# Patient Record
Sex: FEMALE | URBAN - NONMETROPOLITAN AREA
[De-identification: names, ages, dates, MRNs, and addresses within clinical notes are randomized per-mention and may not be internally consistent; named-entity substitution may affect disease eponyms.]

---

## 2019-02-28 ENCOUNTER — OFFICE VISIT (OUTPATIENT)
Dept: URBAN - NONMETROPOLITAN AREA CLINIC 9 | Facility: CLINIC | Age: 79
End: 2019-02-28
Payer: COMMERCIAL

## 2019-02-28 DIAGNOSIS — H25.11 AGE-RELATED NUCLEAR CATARACT OF RIGHT EYE: Chronic | ICD-10-CM

## 2019-02-28 PROCEDURE — 92014 COMPRE OPH EXAM EST PT 1/>: CPT | Performed by: OPTOMETRIST

## 2019-02-28 ASSESSMENT — VISUAL ACUITY: OD: 20/40

## 2019-02-28 ASSESSMENT — INTRAOCULAR PRESSURE
OD: 14
OS: 12

## 2019-02-28 NOTE — IMPRESSION/PLAN
Impression: Presence of intraocular lens: Z96.1. OS. Plan: Discussed diagnosis in detail with patient. No treatment is required at this time. Will continue to observe condition and or symptoms. Patient instructed to call if condition gets worse.

## 2019-02-28 NOTE — IMPRESSION/PLAN
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 Plan: Discussed diagnosis in detail with patient. No treatment is required at this time. Will continue to observe condition and or symptoms. Patient instructed to call if condition gets worse.

## 2019-02-28 NOTE — IMPRESSION/PLAN
Impression: Type 2 diabetes mellitus without complications: M55.0. OU. Plan: Diabetes type II: no background retinopathy, no signs of neovascularization noted. Discussed ocular and systemic benefits of blood sugar control.

## 2020-03-12 ENCOUNTER — OFFICE VISIT (OUTPATIENT)
Dept: URBAN - NONMETROPOLITAN AREA CLINIC 9 | Facility: CLINIC | Age: 80
End: 2020-03-12
Payer: COMMERCIAL

## 2020-03-12 DIAGNOSIS — H25.811 COMBINED FORMS OF AGE-RELATED CATARACT, RIGHT EYE: Chronic | ICD-10-CM

## 2020-03-12 DIAGNOSIS — H10.45 OTHER CHRONIC ALLERGIC CONJUNCTIVITIS: Chronic | ICD-10-CM

## 2020-03-12 PROCEDURE — 92014 COMPRE OPH EXAM EST PT 1/>: CPT | Performed by: OPTOMETRIST

## 2020-03-12 RX ORDER — AZELASTINE HYDROCHLORIDE 0.5 MG/ML
0.05 % SOLUTION/ DROPS OPHTHALMIC
Qty: 6 | Refills: 11 | Status: INACTIVE
Start: 2020-03-12 | End: 2021-05-03

## 2020-03-12 ASSESSMENT — KERATOMETRY
OD: 44.13
OS: 44.00

## 2020-03-12 ASSESSMENT — INTRAOCULAR PRESSURE
OD: 16
OS: 17

## 2020-03-12 ASSESSMENT — VISUAL ACUITY
OS: 20/20
OD: 20/40

## 2020-03-12 NOTE — IMPRESSION/PLAN
Impression: Combined forms of age-related cataract, right eye: H25.811. Plan: Cataracts account for the patient's complaints. Discussed all risks, benefits, procedures and recovery of cataracts surgery. Patient understands that changing the glasses prescription will not significantly improve vision. Patient desires to have surgery, refer to Dr. Kendall Lang for evaluation phacoemulsification with IOL.

## 2020-03-12 NOTE — IMPRESSION/PLAN
Impression: Other chronic allergic conjunctivitis: H10.45. OU. Plan: Discussed diagnosis with patient in detail. Azelastine OU BID.

## 2020-03-12 NOTE — IMPRESSION/PLAN
Impression: Type 2 diabetes mellitus w/o complication: V99.9. OU. Plan: Diabetes type II: no background diabetic retinopathy, no signs of neovascularization noted. Discussed ocular and systemic benefits of blood sugar control. Continue to monitor for changes. Advised patient to RTC immediately if changes to vision are noted.

## 2021-05-03 ENCOUNTER — OFFICE VISIT (OUTPATIENT)
Dept: URBAN - NONMETROPOLITAN AREA CLINIC 9 | Facility: CLINIC | Age: 81
End: 2021-05-03
Payer: COMMERCIAL

## 2021-05-03 DIAGNOSIS — H43.813 VITREOUS DEGENERATION, BILATERAL: Chronic | ICD-10-CM

## 2021-05-03 DIAGNOSIS — H04.123 DRY EYE SYNDROME OF BILATERAL LACRIMAL GLANDS: Chronic | ICD-10-CM

## 2021-05-03 PROCEDURE — 99214 OFFICE O/P EST MOD 30 MIN: CPT | Performed by: OPTOMETRIST

## 2021-05-03 ASSESSMENT — KERATOMETRY
OD: 43.88
OS: 43.88

## 2021-05-03 ASSESSMENT — VISUAL ACUITY: OD: 20/30

## 2021-05-03 NOTE — IMPRESSION/PLAN
Impression: Type 2 diabetes mellitus w/o complication: H67.8. Plan: Diabetes type II: no background retinopathy, no signs of neovascularization noted. Discussed ocular and systemic benefits of blood sugar control. Emphasize importance of annual dilated exams.

## 2021-05-03 NOTE — IMPRESSION/PLAN
Impression: Combined forms of age-related cataract, right eye: H25.811. Plan: Cataracts account for the patient's complaints. Discussed all risks, benefits, procedures and recovery. Patient understands changing glasses will not improve vision. Patient desires to have surgery and referred to Dr. Olivia Rincon for phacoemulsification with intraocular lens.

## 2021-07-15 ENCOUNTER — OFFICE VISIT (OUTPATIENT)
Dept: URBAN - NONMETROPOLITAN AREA CLINIC 9 | Facility: CLINIC | Age: 81
End: 2021-07-15
Payer: COMMERCIAL

## 2021-07-15 DIAGNOSIS — Z96.1 PRESENCE OF INTRAOCULAR LENS: ICD-10-CM

## 2021-07-15 DIAGNOSIS — E11.36 TYPE 2 DIABETES MELLITUS WITH DIABETIC CATARACT: ICD-10-CM

## 2021-07-15 DIAGNOSIS — E11.9 TYPE 2 DIABETES MELLITUS W/O COMPLICATION: ICD-10-CM

## 2021-07-15 PROCEDURE — 99204 OFFICE O/P NEW MOD 45 MIN: CPT | Performed by: OPHTHALMOLOGY

## 2021-07-15 ASSESSMENT — INTRAOCULAR PRESSURE
OD: 16
OS: 14

## 2021-07-15 NOTE — IMPRESSION/PLAN
Impression: Type 2 diabetes mellitus w/o complication: O12.0. Plan: Diabetes type II: no background retinopathy, no signs of neovascularization noted. Discussed ocular and systemic benefits of blood sugar control.

## 2021-07-15 NOTE — IMPRESSION/PLAN
Impression: Combined forms of age-related cataract, right eye: H25.811. Plan: Cataract accounts for pt complaints. Pt desires sx. Schedule CE/IOL right eye (2nd eye). Risk/Benefits/Alternatives discussed with patient. Rec. mono-focal/Toric/Vivity. Consider ORA/LRI. RL2. Target distance. Order a-scan. Rec. cataract surgery in the other eye due to anisometropia. Patient is a candidate for Dexycu. Discussed R/B/A. Recommend Ofloxacin or Tobramycin QID for 1 week postop. Intra-cameral Moxifloxacin to decrease the risk of infection. May elect to use combination drops or generics per patient preference.

## 2021-08-24 PROCEDURE — 76519 ECHO EXAM OF EYE: CPT | Performed by: OPHTHALMOLOGY

## 2021-09-01 ENCOUNTER — PROCEDURE (OUTPATIENT)
Dept: URBAN - METROPOLITAN AREA SURGERY 37 | Facility: SURGERY | Age: 81
End: 2021-09-01
Payer: COMMERCIAL

## 2021-09-01 PROCEDURE — 66984 XCAPSL CTRC RMVL W/O ECP: CPT | Performed by: OPHTHALMOLOGY

## 2021-09-02 ENCOUNTER — POST-OPERATIVE VISIT (OUTPATIENT)
Dept: URBAN - NONMETROPOLITAN AREA CLINIC 8 | Facility: CLINIC | Age: 81
End: 2021-09-02
Payer: COMMERCIAL

## 2021-09-02 PROCEDURE — 99024 POSTOP FOLLOW-UP VISIT: CPT | Performed by: OPTOMETRIST

## 2021-09-02 ASSESSMENT — INTRAOCULAR PRESSURE: OD: 20

## 2021-09-02 NOTE — IMPRESSION/PLAN
Impression: S/P Cataract Extraction by phacoemulsification with IOL placement OD - 1 Day. Presence of intraocular lens  Z96.1.  Plan: RTC in 1 week for PO2 Ofloxacin qid x 7 days od

## 2021-09-10 ENCOUNTER — POST-OPERATIVE VISIT (OUTPATIENT)
Dept: URBAN - NONMETROPOLITAN AREA CLINIC 8 | Facility: CLINIC | Age: 81
End: 2021-09-10
Payer: COMMERCIAL

## 2021-09-10 PROCEDURE — 99024 POSTOP FOLLOW-UP VISIT: CPT | Performed by: OPTOMETRIST

## 2021-09-10 ASSESSMENT — INTRAOCULAR PRESSURE
OD: 15
OS: 15

## 2021-09-10 ASSESSMENT — VISUAL ACUITY
OD: 20/20
OS: 20/20

## 2021-09-10 NOTE — IMPRESSION/PLAN
Impression: S/P Cataract Extraction by phacoemulsification with IOL placement OD - 9 Days. Presence of intraocular lens  Z96.1. Plan: RTC in 3 weeks for PO3.

## 2021-10-01 ENCOUNTER — POST-OPERATIVE VISIT (OUTPATIENT)
Dept: URBAN - NONMETROPOLITAN AREA CLINIC 8 | Facility: CLINIC | Age: 81
End: 2021-10-01
Payer: COMMERCIAL

## 2021-10-01 PROCEDURE — 99024 POSTOP FOLLOW-UP VISIT: CPT | Performed by: OPTOMETRIST

## 2021-10-01 RX ORDER — AZELASTINE HYDROCHLORIDE 0.5 MG/ML
0.05 % SOLUTION/ DROPS OPHTHALMIC
Qty: 5 | Refills: 11 | Status: ACTIVE
Start: 2021-10-01

## 2021-10-01 ASSESSMENT — INTRAOCULAR PRESSURE: OD: 14

## 2021-10-01 NOTE — IMPRESSION/PLAN
Impression: S/P Cataract Extraction by phacoemulsification with IOL placement OD - 30 Days. Presence of intraocular lens  Z96.1. Plan: RTC in 3 months for dilated exam. Prescribed Azelastine BID OU for allergy maintenance. Systane ou qd -Patient reports that Systane does not work too well and would like rec.

## 2022-01-28 ENCOUNTER — OFFICE VISIT (OUTPATIENT)
Dept: URBAN - NONMETROPOLITAN AREA CLINIC 8 | Facility: CLINIC | Age: 82
End: 2022-01-28
Payer: COMMERCIAL

## 2022-01-28 PROCEDURE — 99213 OFFICE O/P EST LOW 20 MIN: CPT | Performed by: OPTOMETRIST

## 2022-01-28 ASSESSMENT — KERATOMETRY
OS: 43.88
OD: 44.25

## 2022-01-28 ASSESSMENT — INTRAOCULAR PRESSURE
OS: 15
OD: 15

## 2022-01-28 NOTE — IMPRESSION/PLAN
Impression: Type 2 diabetes mellitus w/o complication: W56.8. Plan: No diabetic retinopathy today. Discussed importance of closely monitoring and controlling glucose to minimize risks of progression of disease. Patient instructed to notify clinic immediately if changes to vision are noted.

## 2023-01-27 ENCOUNTER — OFFICE VISIT (OUTPATIENT)
Dept: URBAN - NONMETROPOLITAN AREA CLINIC 8 | Facility: CLINIC | Age: 83
End: 2023-01-27
Payer: COMMERCIAL

## 2023-01-27 DIAGNOSIS — E11.9 TYPE 2 DIABETES MELLITUS W/O COMPLICATION: Primary | ICD-10-CM

## 2023-01-27 DIAGNOSIS — H43.813 VITREOUS DEGENERATION, BILATERAL: ICD-10-CM

## 2023-01-27 DIAGNOSIS — H04.123 DRY EYE SYNDROME OF BILATERAL LACRIMAL GLANDS: ICD-10-CM

## 2023-01-27 DIAGNOSIS — Z96.1 PRESENCE OF PSEUDOPHAKIA: ICD-10-CM

## 2023-01-27 PROCEDURE — 92014 COMPRE OPH EXAM EST PT 1/>: CPT | Performed by: OPTOMETRIST

## 2023-01-27 ASSESSMENT — INTRAOCULAR PRESSURE
OS: 15
OD: 14

## 2023-01-27 NOTE — IMPRESSION/PLAN
Impression: Type 2 diabetes mellitus w/o complication: W17.0. Plan: Diabetes type II: no background retinopathy, no signs of neovascularization noted. Discussed ocular and systemic benefits of blood sugar control.

## 2023-01-27 NOTE — IMPRESSION/PLAN
Impression: Dry eye syndrome of bilateral lacrimal glands: H04.123. Plan: Discussed diagnosis with patient. Start OTC artificial tears in OU for comfort, 3-4x's a day.

## 2024-04-26 ENCOUNTER — OFFICE VISIT (OUTPATIENT)
Dept: URBAN - NONMETROPOLITAN AREA CLINIC 8 | Facility: CLINIC | Age: 84
End: 2024-04-26
Payer: COMMERCIAL

## 2024-04-26 DIAGNOSIS — H43.813 VITREOUS DEGENERATION, BILATERAL: ICD-10-CM

## 2024-04-26 DIAGNOSIS — H04.123 DRY EYE SYNDROME OF BILATERAL LACRIMAL GLANDS: ICD-10-CM

## 2024-04-26 DIAGNOSIS — E11.9 TYPE 2 DIABETES MELLITUS W/O COMPLICATION: Primary | ICD-10-CM

## 2024-04-26 DIAGNOSIS — H10.45 OTHER CHRONIC ALLERGIC CONJUNCTIVITIS: ICD-10-CM

## 2024-04-26 DIAGNOSIS — H26.493 OTHER SECONDARY CATARACT, BILATERAL: ICD-10-CM

## 2024-04-26 PROCEDURE — 99214 OFFICE O/P EST MOD 30 MIN: CPT | Performed by: OPTOMETRIST

## 2024-04-26 RX ORDER — PREDNISOLONE ACETATE 10 MG/ML
1 % SUSPENSION/ DROPS OPHTHALMIC
Qty: 5 | Refills: 0 | Status: INACTIVE
Start: 2024-04-26 | End: 2024-05-05

## 2024-04-26 RX ORDER — KETOTIFEN FUMARATE 0.25 MG/ML
SOLUTION OPHTHALMIC
Qty: 5 | Refills: 11 | Status: ACTIVE
Start: 2024-04-26

## 2024-04-26 ASSESSMENT — INTRAOCULAR PRESSURE
OS: 13
OD: 13

## 2025-04-18 ENCOUNTER — OFFICE VISIT (OUTPATIENT)
Dept: URBAN - NONMETROPOLITAN AREA CLINIC 8 | Facility: CLINIC | Age: 85
End: 2025-04-18
Payer: COMMERCIAL

## 2025-04-18 DIAGNOSIS — H43.813 VITREOUS DEGENERATION, BILATERAL: ICD-10-CM

## 2025-04-18 DIAGNOSIS — Z96.1 PRESENCE OF INTRAOCULAR LENS: ICD-10-CM

## 2025-04-18 DIAGNOSIS — E11.9 TYPE 2 DIABETES MELLITUS W/O COMPLICATION: Primary | ICD-10-CM

## 2025-04-18 DIAGNOSIS — H16.223 KERATOCONJUNCTIVITIS SICCA, BILATERAL: ICD-10-CM

## 2025-04-18 PROCEDURE — 92014 COMPRE OPH EXAM EST PT 1/>: CPT | Performed by: OPTOMETRIST

## 2025-04-18 ASSESSMENT — KERATOMETRY
OS: 44.13
OD: 44.63

## 2025-04-18 ASSESSMENT — INTRAOCULAR PRESSURE
OD: 14
OS: 14